# Patient Record
Sex: MALE | Race: WHITE | Employment: OTHER | URBAN - METROPOLITAN AREA
[De-identification: names, ages, dates, MRNs, and addresses within clinical notes are randomized per-mention and may not be internally consistent; named-entity substitution may affect disease eponyms.]

---

## 2022-11-07 ENCOUNTER — OFFICE VISIT (OUTPATIENT)
Dept: ORTHOPEDIC SURGERY | Age: 62
End: 2022-11-07
Payer: COMMERCIAL

## 2022-11-07 VITALS — HEIGHT: 71 IN | BODY MASS INDEX: 43.4 KG/M2 | WEIGHT: 310 LBS

## 2022-11-07 DIAGNOSIS — M25.562 CHRONIC PAIN OF LEFT KNEE: Primary | ICD-10-CM

## 2022-11-07 DIAGNOSIS — M25.462 KNEE EFFUSION, LEFT: ICD-10-CM

## 2022-11-07 DIAGNOSIS — M25.562 MEDIAL KNEE PAIN, LEFT: ICD-10-CM

## 2022-11-07 DIAGNOSIS — M25.562 KNEE MENISCUS PAIN, LEFT: ICD-10-CM

## 2022-11-07 DIAGNOSIS — M17.12 PATELLOFEMORAL ARTHRITIS OF LEFT KNEE: ICD-10-CM

## 2022-11-07 DIAGNOSIS — S83.242A ACUTE MEDIAL MENISCUS TEAR, LEFT, INITIAL ENCOUNTER: ICD-10-CM

## 2022-11-07 DIAGNOSIS — M25.562 PAIN OF LEFT KNEE AFTER INJURY: ICD-10-CM

## 2022-11-07 DIAGNOSIS — G89.29 CHRONIC PAIN OF LEFT KNEE: Primary | ICD-10-CM

## 2022-11-07 PROCEDURE — 99204 OFFICE O/P NEW MOD 45 MIN: CPT | Performed by: ORTHOPAEDIC SURGERY

## 2022-11-07 PROCEDURE — 20610 DRAIN/INJ JOINT/BURSA W/O US: CPT | Performed by: ORTHOPAEDIC SURGERY

## 2022-11-07 RX ORDER — BUPIVACAINE HYDROCHLORIDE 7.5 MG/ML
2 INJECTION, SOLUTION EPIDURAL; RETROBULBAR ONCE
Status: COMPLETED | OUTPATIENT
Start: 2022-11-07 | End: 2022-11-07

## 2022-11-07 RX ORDER — METHYLPREDNISOLONE ACETATE 80 MG/ML
80 INJECTION, SUSPENSION INTRA-ARTICULAR; INTRALESIONAL; INTRAMUSCULAR; SOFT TISSUE ONCE
Status: COMPLETED | OUTPATIENT
Start: 2022-11-07 | End: 2022-11-07

## 2022-11-07 RX ADMIN — METHYLPREDNISOLONE ACETATE 80 MG: 80 INJECTION, SUSPENSION INTRA-ARTICULAR; INTRALESIONAL; INTRAMUSCULAR; SOFT TISSUE at 10:03

## 2022-11-07 RX ADMIN — BUPIVACAINE HYDROCHLORIDE 15 MG: 7.5 INJECTION, SOLUTION EPIDURAL; RETROBULBAR at 10:03

## 2022-11-07 NOTE — PROGRESS NOTES
Cory Lagos (: 1960) is a 58 y.o. male, patient, here for evaluation of the following chief complaint(s):  Knee Pain (Left knee pain x 1 year, worse in last 3 months)       HPI:    He began having increased left knee pain almost 1 year ago. The patient states that he was injured playing golf. He describes his left knee pain now is extremely severe, sharp, and constant. His left knee pain does make it very difficult for him to go to sleep it does wake him up from sleep. The patient states his left knee pain continues to get worse. He reports that walking and lying in bed makes pain worse and rest and ice makes pain better. The patient has been taking medication for his discomfort as needed. He reports at least 1 previous injection in his left knee. The patient was seen in the emergency room in [facility]. The patient does report previous x-rays on his left knee. He reports no previous or related left knee surgery. Not on File    No current outpatient medications on file. No current facility-administered medications for this visit. History reviewed. No pertinent past medical history. History reviewed. No pertinent surgical history. History reviewed. No pertinent family history.      Social History     Socioeconomic History    Marital status:      Spouse name: Not on file    Number of children: Not on file    Years of education: Not on file    Highest education level: Not on file   Occupational History    Not on file   Tobacco Use    Smoking status: Not on file    Smokeless tobacco: Not on file   Substance and Sexual Activity    Alcohol use: Not on file    Drug use: Not on file    Sexual activity: Not on file   Other Topics Concern    Not on file   Social History Narrative    Not on file     Social Determinants of Health     Financial Resource Strain: Not on file   Food Insecurity: Not on file   Transportation Needs: Not on file   Physical Activity: Not on file Stress: Not on file   Social Connections: Not on file   Intimate Partner Violence: Not on file   Housing Stability: Not on file       Review of Systems   All other systems reviewed and are negative. Vitals:  Ht 5' 11\" (1.803 m)   Wt 310 lb (140.6 kg)   BMI 43.24 kg/m²    Body mass index is 43.24 kg/m². Ortho Exam     The patient is well-developed and well-nourished. The patient presents today in alert and oriented x3 with a normal mood and affect. The patient stands with a normal weightbearing line but walks with a slightly antalgic gait because of his left knee pain. Left knee is tender to palpation along the medial joint line and has a mild size effusion. The patient has positive Margarita´s test and the knee is stable. There is a lack full flexion secondary to the effusion but does have full extension. He does have pain with patellar compression and quadriceps contraction with crepitus. There is a positive patellar grind test.  There is 5/5 strength. The knee and lower extremity is neurovascularly intact. There is no erythema or warmth the skin is normal.    ASSESSMENT/PLAN:      1. Chronic pain of left knee  -     XR KNEE LT MIN 4 V; Future  2. Acute medial meniscus tear, left, initial encounter  -     MRI KNEE LT WO CONT; Future  3. Knee meniscus pain, left  4. Pain of left knee after injury  5. Medial knee pain, left  6. Patellofemoral arthritis of left knee  -     MRI KNEE LT WO CONT; Future  7. Knee effusion, left  -     bupivacaine (PF) (MARCAINE) 0.75 % (7.5 mg/mL) injection 15 mg; 15 mg (2 mL), Intra artICUlar, ONCE, 1 dose, On Mon 11/7/22 at 1100  -     methylPREDNISolone acetate (DEPO-MEDROL) 80 mg/mL injection 80 mg; 80 mg, Intra artICUlar, ONCE, 1 dose, On Mon 11/7/22 at 1100     XR Results (most recent):  Results from Appointment encounter on 11/07/22    XR KNEE LT MIN 4 V    Narrative  Left knee 4 view x-ray showed no evidence of a fracture or dislocation.   Evidence of advanced patellofemoral osteoarthritis. Below is the assessment and plan developed based on review of pertinent history, physical exam, labs, studies, and medications. We discussed the patient's ongoing and worsening left knee pain and his signs, symptoms, physical exam, description of his pain, description of his golf related injury, and x-rays are consistent with an acute medial meniscus tear with advanced patellofemoral compartment arthritis. He does present today with a mild size effusion present. The possible treatment options were discussed with the patient and because of an effusion present we elected to aspirate and inject his left knee with cortisone today to try and alleviate some of his discomfort. The risks and benefits of the aspiration injection were discussed in detail with the patient and under sterile prep the patient's left knee was aspirated of approximately 10 ccs of straw-colored synovial fluid and injected with 1 cc of 80 mg/cc of Depo-Medrol and 2 ccs of 0.75% Sensorcaine. He tolerated both the aspiration and injection well. Also because of the 1 year long duration of his increased pain, no improvement with multiple modalities of conservative management occluding an at-home exercise program, his physical exam, description of his pain, description of his golf injury, x-rays, and his inability to complete daily living activities and athletic activities we elected to obtain an MRI of his left knee to further evaluate the severity of his acute medial meniscus tear, patellofemoral compartment osteoarthritis, and causation for his effusion. The MRI images and results will be used in preoperative planning if and almost certainly when surgical intervention is necessary. The risks and benefits of the MRI were discussed in detail with the patient and he would like to proceed. We will schedule at his convenience.   I will see him back after his MRI is complete to discuss the images, results, further treatment options. In the interim, I did encourage him to ice and elevate when possible, modify his activity level based on his left knee pain, monitor his effusion, and use anti-inflammatory medication when necessary. The patient will also work on range of motion, strengthening, and stretching exercises with an at-home exercise program as pain tolerates. He is to avoid any deep knee bend activities against resistance, squatting, kneeling, stairs, lunging, cutting, twisting, change direction, and high impact loading activities. I will see him back as noted above after his left knee MRI is complete. **We will obtain an MRI for more information to determine the best treatment plan moving forward and help us prepare for surgical intervention if necessary. **    Return for After his left knee MRI is complete. An electronic signature was used to authenticate this note.   -- Marcella Villarreal MD

## 2022-11-14 ENCOUNTER — OFFICE VISIT (OUTPATIENT)
Dept: ORTHOPEDIC SURGERY | Age: 62
End: 2022-11-14
Payer: COMMERCIAL

## 2022-11-14 ENCOUNTER — DOCUMENTATION ONLY (OUTPATIENT)
Dept: ORTHOPEDIC SURGERY | Age: 62
End: 2022-11-14

## 2022-11-14 VITALS — BODY MASS INDEX: 42 KG/M2 | HEIGHT: 71 IN | WEIGHT: 300 LBS

## 2022-11-14 DIAGNOSIS — M94.262 CHONDROMALACIA, LEFT KNEE: ICD-10-CM

## 2022-11-14 DIAGNOSIS — M25.562 PAIN OF LEFT KNEE AFTER INJURY: ICD-10-CM

## 2022-11-14 DIAGNOSIS — M25.562 MEDIAL KNEE PAIN, LEFT: ICD-10-CM

## 2022-11-14 DIAGNOSIS — M23.42 LOOSE BODY OF LEFT KNEE: ICD-10-CM

## 2022-11-14 DIAGNOSIS — G89.29 CHRONIC PAIN OF LEFT KNEE: Primary | ICD-10-CM

## 2022-11-14 DIAGNOSIS — S83.242D ACUTE MEDIAL MENISCUS TEAR, LEFT, SUBSEQUENT ENCOUNTER: ICD-10-CM

## 2022-11-14 DIAGNOSIS — M25.562 CHRONIC PAIN OF LEFT KNEE: Primary | ICD-10-CM

## 2022-11-14 DIAGNOSIS — M25.562 KNEE MENISCUS PAIN, LEFT: ICD-10-CM

## 2022-11-14 PROCEDURE — 99214 OFFICE O/P EST MOD 30 MIN: CPT | Performed by: ORTHOPAEDIC SURGERY

## 2022-11-14 NOTE — PROGRESS NOTES
Natalia Du (: 1960) is a 58 y.o. male, patient, here for evaluation of the following chief complaint(s):  Knee Pain (Left, MRI results)       HPI:    He was last seen for his left knee pain on 2022. Since then, the patient did have an MRI performed on his left knee on 11/10/2022. The patient states that his pain is the same as it was at his last visit. He rates the severity of his left knee pain as a 7 out of 10. He describes his left knee pain as sharp and constant. His left knee pain does make it difficult for him to go to sleep it does wake him up from sleep. He has been taking Aleve for his discomfort as needed. The patient did have his left knee aspirated and injected with cortisone at his last visit. He does state that the aspiration and injection did help alleviate some of his discomfort. Left knee MRI images and results were independently reviewed and they were consistent with an undersurface horizontal tearing of the body and posterior horn of the medial meniscus with peripheral subluxation of the body. Tricompartmental chondral derangement is noted. 5 mm loose body in the posterior recess of the medial compartment. Not on File    No current outpatient medications on file. No current facility-administered medications for this visit. History reviewed. No pertinent past medical history. History reviewed. No pertinent surgical history. History reviewed. No pertinent family history.      Social History     Socioeconomic History    Marital status:      Spouse name: Not on file    Number of children: Not on file    Years of education: Not on file    Highest education level: Not on file   Occupational History    Not on file   Tobacco Use    Smoking status: Not on file    Smokeless tobacco: Not on file   Substance and Sexual Activity    Alcohol use: Not on file    Drug use: Not on file    Sexual activity: Not on file   Other Topics Concern    Not on file Social History Narrative    Not on file     Social Determinants of Health     Financial Resource Strain: Not on file   Food Insecurity: Not on file   Transportation Needs: Not on file   Physical Activity: Not on file   Stress: Not on file   Social Connections: Not on file   Intimate Partner Violence: Not on file   Housing Stability: Not on file       Review of Systems   All other systems reviewed and are negative. Vitals:  Ht 5' 11\" (1.803 m)   Wt 300 lb (136.1 kg)   BMI 41.84 kg/m²    Body mass index is 41.84 kg/m². Ortho Exam     The patient is well-developed and well-nourished. The patient presents today in alert and oriented x3 with a normal mood and affect. The patient stands with a normal weightbearing line but walks with slightly antalgic gait because of his left knee pain. He    Left knee is tender to palpation along the medial joint line and has a small effusion. The patient has positive Margarita´s test and the knee is stable. There is a lack full flexion secondary to the effusion but does have full extension. There is 5/5 strength. The knee and lower extremity is neurovascularly intact. There is no erythema or warmth the skin is normal.    ASSESSMENT/PLAN:      1. Chronic pain of left knee  2. Knee meniscus pain, left  3. Pain of left knee after injury  4. Medial knee pain, left  5. Chondromalacia, left knee  6. Acute medial meniscus tear, left, subsequent encounter  -     REFERRAL TO ORTHOPEDIC SURGERY  7. Loose body of left knee     Below is the assessment and plan developed based on review of pertinent history, physical exam, labs, studies, and medications. We discussed the patient's ongoing left knee pain and we independently reviewed his MRI images and results and they were consistent with an undersurface horizontal tearing of the body and posterior horn of the medial meniscus with peripheral subluxation of the body. Tricompartmental chondral derangement is noted.   5 mm loose body in the posterior recess of the medial compartment. The possible treatment options were discussed with the patient and because of the duration of his increased pain, minimal improvement with multiple modalities of conservative management including an at-home exercise program, his physical exam, description of his pain, past x-rays, independently reviewed MRI images and results, and his inability to complete daily living activities without significant discomfort we both decided that surgical intervention was the best treatment plan. The risks and benefits of a left knee arthroscopic exam with partial medial meniscectomy were discussed in detail with the patient and he would like to proceed. We will schedule at his convenience. In the interim, I did encourage him to ice when possible, modify his activity level based on his left knee pain, and use anti-inflammatory medication when necessary. He will also work on range of motion, strengthening, and stretching exercises with an at-home exercise program as pain tolerates. He is to avoid any deep knee bend activities against resistance, squatting, kneeling, stairs, lunging, cutting, twisting, change of direction, and high impact loading activities. I will see him back in the office on an as-needed basis or the day of his upcoming left knee surgery. Return for In the office as needed or on the day of his upcoming left knee surgery. An electronic signature was used to authenticate this note.   -- Jori France MD

## 2022-11-16 ENCOUNTER — TELEPHONE (OUTPATIENT)
Dept: ORTHOPEDIC SURGERY | Age: 62
End: 2022-11-16

## 2022-11-23 ENCOUNTER — TELEPHONE (OUTPATIENT)
Dept: ORTHOPEDIC SURGERY | Age: 62
End: 2022-11-23

## 2022-11-23 DIAGNOSIS — S83.242D ACUTE MEDIAL MENISCUS TEAR, LEFT, SUBSEQUENT ENCOUNTER: Primary | ICD-10-CM

## 2022-11-23 RX ORDER — ZOLPIDEM TARTRATE 10 MG/1
10 TABLET ORAL
Status: CANCELLED | OUTPATIENT
Start: 2022-11-23

## 2022-11-28 RX ORDER — OXYCODONE AND ACETAMINOPHEN 5; 325 MG/1; MG/1
1 TABLET ORAL
Qty: 30 TABLET | Refills: 0 | Status: SHIPPED | OUTPATIENT
Start: 2022-11-28 | End: 2022-12-05

## 2022-12-01 NOTE — PROGRESS NOTES
Keli Diego (: 1960) is a 58 y.o. male, patient, here for evaluation of the following chief complaint(s):  Knee Pain (left) and Surgical Follow-up (Sx 22)       HPI:    He is now approximately 1 week status post left knee arthroscopic exam with partial medial meniscectomy and synovectomy. His surgery was performed on 2022. The patient rates the severity of his postoperative left knee pain as a 1 out of 10. He describes his postoperative left knee pain is dull and intermittent. His pain does not wake him up from sleep at night. He has been taking aspirin for his discomfort as needed. No Known Allergies    No current outpatient medications on file. No current facility-administered medications for this visit. History reviewed. No pertinent past medical history. History reviewed. No pertinent surgical history. History reviewed. No pertinent family history. Social History     Socioeconomic History    Marital status:      Spouse name: Not on file    Number of children: Not on file    Years of education: Not on file    Highest education level: Not on file   Occupational History    Not on file   Tobacco Use    Smoking status: Not on file    Smokeless tobacco: Not on file   Substance and Sexual Activity    Alcohol use: Not on file    Drug use: Not on file    Sexual activity: Not on file   Other Topics Concern    Not on file   Social History Narrative    Not on file     Social Determinants of Health     Financial Resource Strain: Not on file   Food Insecurity: Not on file   Transportation Needs: Not on file   Physical Activity: Not on file   Stress: Not on file   Social Connections: Not on file   Intimate Partner Violence: Not on file   Housing Stability: Not on file       Review of Systems   All other systems reviewed and are negative. Vitals:  Ht 5' 11\" (1.803 m)   Wt 310 lb (140.6 kg)   BMI 43.24 kg/m²    Body mass index is 43.24 kg/m².     Ortho Exam     The patient is well-developed and well-nourished. The patient presents today in alert and oriented x3 with a normal mood and affect. The patient stands with a normal weightbearing line but walks with a slightly antalgic gait because of his postoperative left knee pain. Left knee wounds are clean and dry neurovascular intact. There is some ecchymosis but no erythema. His stitches were removed and his incisions are healing nicely with no sign of irritation or infection and look normal.  He does have full extension. There is a small postoperative effusion present which is to be expected. His seated nonweightbearing range of motion has improved since his surgical procedure. He has approximately 90 degrees of seated nonweightbearing flexion. There is limitation in his range of motion secondary to his postoperative discomfort and effusion. He can do a seated straight leg raise but there is quadriceps weakness noted compared to normal.  He reports no calf tenderness. His sensations are intact his pulses are 2+. His skin is healing nicely. ASSESSMENT/PLAN:      1. Postoperative pain of left knee  2. Status post arthroscopic surgery of left knee     Below is the assessment and plan developed based on review of pertinent history, physical exam, labs, studies, and medications. We discussed the patient's recent left knee arthroscopic exam with partial medial meniscectomy and synovectomy. His surgery was performed on 11/28/2022. The patient is now approximately 1 week status post surgical intervention. He is progressing nicely in the early stages of his recovery and having the appropriate amount of expected postoperative discomfort at this time. We did remove the patient stitches today in the office without complication. His incisions are healing nicely with no sign of irritation or infection and look normal.  He does have full extension.   There is a small postoperative effusion present which we will continue to monitor. His seated nonweightbearing range of motion and strength have improved since his surgical procedure. The patient will continue the postoperative protocol by working on range of motion, strengthening, and stretching exercises with an at-home exercise program as pain tolerates. He may continue to slowly increase activities as tolerated and as discussed today in the office. He is still to avoid any deep knee bend activities against resistance, squatting, kneeling, stairs, lunging, and high impact loading activities. I did encourage him to ice and elevate when possible, modify his activity level based on his postoperative left knee pain, monitor his postoperative effusion, and use anti-inflammatory medication when necessary. I will see him back in 3 weeks for reevaluation and further discussion of the postoperative protocol. Return in about 3 weeks (around 12/27/2022) for Re-evaluation and further discussion of the postop protocol. An electronic signature was used to authenticate this note.   -- Chantal Anguiano MD

## 2022-12-06 ENCOUNTER — OFFICE VISIT (OUTPATIENT)
Dept: ORTHOPEDIC SURGERY | Age: 62
End: 2022-12-06
Payer: COMMERCIAL

## 2022-12-06 VITALS — WEIGHT: 310 LBS | BODY MASS INDEX: 43.4 KG/M2 | HEIGHT: 71 IN

## 2022-12-06 DIAGNOSIS — G89.18 POSTOPERATIVE PAIN OF LEFT KNEE: Primary | ICD-10-CM

## 2022-12-06 DIAGNOSIS — Z98.890 STATUS POST ARTHROSCOPIC SURGERY OF LEFT KNEE: ICD-10-CM

## 2022-12-06 DIAGNOSIS — M25.562 POSTOPERATIVE PAIN OF LEFT KNEE: Primary | ICD-10-CM

## 2022-12-06 PROCEDURE — 99024 POSTOP FOLLOW-UP VISIT: CPT | Performed by: ORTHOPAEDIC SURGERY

## 2022-12-20 ENCOUNTER — OFFICE VISIT (OUTPATIENT)
Dept: ORTHOPEDIC SURGERY | Age: 62
End: 2022-12-20
Payer: COMMERCIAL

## 2022-12-20 VITALS — HEIGHT: 71 IN | WEIGHT: 300 LBS | BODY MASS INDEX: 42 KG/M2

## 2022-12-20 DIAGNOSIS — G89.18 POSTOPERATIVE PAIN OF LEFT KNEE: Primary | ICD-10-CM

## 2022-12-20 DIAGNOSIS — Z98.890 STATUS POST ARTHROSCOPIC SURGERY OF LEFT KNEE: ICD-10-CM

## 2022-12-20 DIAGNOSIS — M25.562 POSTOPERATIVE PAIN OF LEFT KNEE: Primary | ICD-10-CM

## 2022-12-20 PROCEDURE — 99024 POSTOP FOLLOW-UP VISIT: CPT | Performed by: ORTHOPAEDIC SURGERY

## 2022-12-20 NOTE — PROGRESS NOTES
Derrell Haro (: 1960) is a 58 y.o. male, patient, here for evaluation of the following chief complaint(s):  Knee Pain (Left ) and Surgical Follow-up (Sx 22)       HPI:    He is now approximately 3 weeks status post left knee arthroscopic exam with partial medial meniscectomy and synovectomy. His surgery was performed on 2022. He was last seen on 2022. The patient states that his pain level has improved since his last visit and surgical procedure. He rates the severity of his postoperative left knee pain as a 1 out of 10. He describes his pain is dull and intermittent. His postoperative left knee pain does not wake him up from sleep at night. He reports taking no medication for his postoperative discomfort. No Known Allergies    No current outpatient medications on file. No current facility-administered medications for this visit. History reviewed. No pertinent past medical history. History reviewed. No pertinent surgical history. History reviewed. No pertinent family history.      Social History     Socioeconomic History    Marital status:      Spouse name: Not on file    Number of children: Not on file    Years of education: Not on file    Highest education level: Not on file   Occupational History    Not on file   Tobacco Use    Smoking status: Not on file    Smokeless tobacco: Not on file   Substance and Sexual Activity    Alcohol use: Not on file    Drug use: Not on file    Sexual activity: Not on file   Other Topics Concern    Not on file   Social History Narrative    Not on file     Social Determinants of Health     Financial Resource Strain: Not on file   Food Insecurity: Not on file   Transportation Needs: Not on file   Physical Activity: Not on file   Stress: Not on file   Social Connections: Not on file   Intimate Partner Violence: Not on file   Housing Stability: Not on file       Review of Systems   All other systems reviewed and are negative. Vitals:  Ht 5' 11\" (1.803 m)   Wt 300 lb (136.1 kg)   BMI 41.84 kg/m²    Body mass index is 41.84 kg/m². Ortho Exam     The patient is well-developed and well-nourished. The patient presents today in alert and oriented x3 with a normal mood and affect. The patient stands with a normal weightbearing line but but walks with a slightly antalgic gait because of his postoperative left knee pain. Left knee wounds are clean and dry neurovascular intact. There is no ecchymosis or erythema. His incisions are well-healed with no sign of irritation or infection and look normal.  He does have full extension. There is no significant postoperative effusion present. His range of motion continues to improve nicely. He has approximately 120 degrees of seated nonweightbearing flexion. There is limitation in his range of motion secondary to his postoperative discomfort which is normal to be expected. His quadricep strength continues to improve. He can easily do a seated straight leg raise without discomfort but there is quadriceps weakness noted compared to normal.  He reports no calf tenderness. His sensations are intact his pulses are 2+. His skin is well-healed. ASSESSMENT/PLAN:      1. Postoperative pain of left knee  2. Status post arthroscopic surgery of left knee     Below is the assessment and plan developed based on review of pertinent history, physical exam, labs, studies, and medications. We discussed the patient's left knee arthroscopic exam with partial medial meniscectomy and synovectomy. His surgery was performed on 11/28/2022. He is now approximately 3 weeks status postsurgical intervention. The patient continues to progress nicely in his recovery. His pain is intermittent and well controlled. His incisions are well-healed with no sign of irritation or infection and look normal.  He does have full extension. There is no postoperative effusion present.   His range of motion and strength continue to improve. The patient will continue the postoperative protocol by working on range of motion, strengthening, and stretching exercises with an at-home exercise program as pain tolerates. He may continue to increase activities as tolerated and as discussed today in the office. He is still to avoid any deep knee bend activities against resistance, squatting, kneeling, stairs, lunging, and high impact loading activities. I did encourage him to continue to ice and elevate when possible, modify his activity level based on his postoperative left knee pain, and use anti-inflammatory medication when necessary. I will see him back in 3 weeks for reevaluation and further discussion of the postoperative protocol. Return in about 4 weeks (around 1/17/2023), or if symptoms worsen or fail to improve. An electronic signature was used to authenticate this note.   -- Alexandria Levine MD

## 2024-10-15 ENCOUNTER — HOSPITAL ENCOUNTER (EMERGENCY)
Facility: HOSPITAL | Age: 64
Discharge: HOME | End: 2024-10-15
Attending: EMERGENCY MEDICINE
Payer: COMMERCIAL

## 2024-10-15 VITALS
HEART RATE: 76 BPM | HEIGHT: 71 IN | OXYGEN SATURATION: 98 % | TEMPERATURE: 98.5 F | RESPIRATION RATE: 16 BRPM | BODY MASS INDEX: 38.92 KG/M2 | DIASTOLIC BLOOD PRESSURE: 82 MMHG | WEIGHT: 278 LBS | SYSTOLIC BLOOD PRESSURE: 127 MMHG

## 2024-10-15 DIAGNOSIS — I83.891 BLEEDING FROM VARICOSE VEINS OF LOWER EXTREMITY, RIGHT: Primary | ICD-10-CM

## 2024-10-15 PROCEDURE — 99283 EMERGENCY DEPT VISIT LOW MDM: CPT

## 2024-10-15 RX ORDER — AMLODIPINE AND BENAZEPRIL HYDROCHLORIDE 5; 20 MG/1; MG/1
1 CAPSULE ORAL DAILY
COMMUNITY

## 2024-10-15 RX ORDER — TAMSULOSIN HYDROCHLORIDE 0.4 MG/1
1 CAPSULE ORAL DAILY
COMMUNITY

## 2024-10-15 ASSESSMENT — PAIN SCALES - GENERAL: PAINLEVEL_OUTOF10: 0

## 2024-10-16 NOTE — ED TRIAGE NOTES
Pt arrived via EMS from home with report of bleeding to RLL - anterior aspect where he has a varicose vein. Bleeding is a steady flow per pt that started after getting out of hot shower at 2130 tonight. Pt states that he may have scratched his leg d/t insect biting him. Pressure dressing intact and dry. + pedal pulses - strong.

## 2024-10-16 NOTE — ED PROVIDER NOTES
[facility] EMERGENCY DEP  EMERGENCY DEPARTMENT ENCOUNTER       Pt Name: Efrain Santiago  MRN: [id number]  Birthdate 1960  Date of evaluation: 10/15/2024  Provider: Vinicio Peña MD   PCP: Merlin Lorenzo MD  Note Started: 11:31 PM 10/15/24      FINAL IMPRESSION     1. Bleeding from varicose veins of lower extremity, right          DISPOSITION/PLAN     Disposition:  DISPOSITION Decision To Discharge 10/15/2024 11:30:23 PM  Condition at Disposition: Data Unavailable      Discharge Note: The patient is stable for discharge home. The signs, symptoms, diagnosis, and discharge instructions have been discussed, understanding conveyed, and agreed upon. The patient is to follow up as recommended or return to ER should their symptoms worsen.      PATIENT REFERRED TO:  Merlin Lorenzo MD  [address] [address] [phone]    Schedule an appointment as soon as possible for a visit in 2 days  For Follow Up, If Not Improving          DISCHARGE MEDICATIONS:     Medication List        ASK your doctor about these medications      amLODIPine-benazepril 5-20 MG per capsule  Commonly known as: LOTREL     ASPIRIN 81 PO     tamsulosin 0.4 MG capsule  Commonly known as: FLOMAX                DISCONTINUED MEDICATIONS:  Current Discharge Medication List          Return to ED if worse      CHIEF COMPLAINT       Chief Complaint   Patient presents with   • Varicose Veins     Bleeding varicose vein left lower anterior leg        HISTORY OF PRESENT ILLNESS: 1 or more elements      History From: Patient  None     HPI    Efrain Santiago is a 64 y.o. male who presents complaining of bleeding from a varicose vein of the right lower leg.  Patient reports she had some bug bites and has been scratching his legs scratch his leg and it started bleeding.  EMS was called applied a pressure dressing and is brought in.  Patient takes aspirin no other blood thinners.      There are no other complaints, changes, or physical

## 2025-08-12 ENCOUNTER — APPOINTMENT (OUTPATIENT)
Facility: HOSPITAL | Age: 65
End: 2025-08-12
Payer: MEDICARE

## 2025-08-12 ENCOUNTER — HOSPITAL ENCOUNTER (EMERGENCY)
Facility: HOSPITAL | Age: 65
Discharge: HOME | End: 2025-08-12
Attending: EMERGENCY MEDICINE
Payer: MEDICARE

## 2025-08-12 ENCOUNTER — TRANSCRIBE ORDERS (OUTPATIENT)
Facility: HOSPITAL | Age: 65
End: 2025-08-12

## 2025-08-12 VITALS
OXYGEN SATURATION: 94 % | BODY MASS INDEX: 37.65 KG/M2 | WEIGHT: 263 LBS | SYSTOLIC BLOOD PRESSURE: 119 MMHG | DIASTOLIC BLOOD PRESSURE: 74 MMHG | TEMPERATURE: 98.9 F | RESPIRATION RATE: 20 BRPM | HEIGHT: 70 IN | HEART RATE: 95 BPM

## 2025-08-12 DIAGNOSIS — R07.81 PLEURITIC CHEST PAIN: Primary | ICD-10-CM

## 2025-08-12 DIAGNOSIS — I31.9 PERICARDITIS, UNSPECIFIED CHRONICITY, UNSPECIFIED TYPE: Primary | ICD-10-CM

## 2025-08-12 LAB
ALBUMIN SERPL-MCNC: 3.7 G/DL (ref 3.5–5)
ALBUMIN/GLOB SERPL: 0.9 (ref 1.1–2.2)
ALP SERPL-CCNC: 72 U/L (ref 45–117)
ALT SERPL-CCNC: 22 U/L (ref 12–78)
ANION GAP SERPL CALC-SCNC: 7 MMOL/L (ref 2–12)
AST SERPL-CCNC: 15 U/L (ref 15–37)
BASOPHILS # BLD: 0.05 K/UL (ref 0–0.1)
BASOPHILS NFR BLD: 0.4 % (ref 0–1)
BILIRUB SERPL-MCNC: 0.7 MG/DL (ref 0.2–1)
BUN SERPL-MCNC: 16 MG/DL (ref 6–20)
BUN/CREAT SERPL: 15 (ref 12–20)
CALCIUM SERPL-MCNC: 9.2 MG/DL (ref 8.5–10.1)
CHLORIDE SERPL-SCNC: 100 MMOL/L (ref 97–108)
CO2 SERPL-SCNC: 31 MMOL/L (ref 21–32)
CREAT SERPL-MCNC: 1.08 MG/DL (ref 0.7–1.3)
CRP SERPL HS-MCNC: 2.4 MG/L (ref 0–5)
D DIMER PPP FEU-MCNC: 0.57 MG/L FEU (ref 0–0.65)
DIFFERENTIAL METHOD BLD: ABNORMAL
EKG ATRIAL RATE: 101 BPM
EKG ATRIAL RATE: 93 BPM
EKG DIAGNOSIS: NORMAL
EKG DIAGNOSIS: NORMAL
EKG P AXIS: 30 DEGREES
EKG P AXIS: 42 DEGREES
EKG P-R INTERVAL: 166 MS
EKG P-R INTERVAL: 170 MS
EKG Q-T INTERVAL: 344 MS
EKG Q-T INTERVAL: 344 MS
EKG QRS DURATION: 86 MS
EKG QRS DURATION: 86 MS
EKG QTC CALCULATION (BAZETT): 427 MS
EKG QTC CALCULATION (BAZETT): 446 MS
EKG R AXIS: -11 DEGREES
EKG R AXIS: -12 DEGREES
EKG T AXIS: 57 DEGREES
EKG T AXIS: 58 DEGREES
EKG VENTRICULAR RATE: 101 BPM
EKG VENTRICULAR RATE: 93 BPM
EOSINOPHIL # BLD: 0.07 K/UL (ref 0–0.4)
EOSINOPHIL NFR BLD: 0.6 % (ref 0–7)
ERYTHROCYTE [DISTWIDTH] IN BLOOD BY AUTOMATED COUNT: 13 % (ref 11.5–14.5)
ERYTHROCYTE [SEDIMENTATION RATE] IN BLOOD: 15 MM/HR (ref 0–20)
FLUAV RNA SPEC QL NAA+PROBE: NOT DETECTED
FLUBV RNA SPEC QL NAA+PROBE: NOT DETECTED
GLOBULIN SER CALC-MCNC: 4.2 G/DL (ref 2–4)
GLUCOSE SERPL-MCNC: 91 MG/DL (ref 65–100)
HCT VFR BLD AUTO: 41.9 % (ref 36.6–50.3)
HGB BLD-MCNC: 14.4 G/DL (ref 12.1–17)
IMM GRANULOCYTES # BLD AUTO: 0.03 K/UL (ref 0–0.04)
IMM GRANULOCYTES NFR BLD AUTO: 0.2 % (ref 0–0.5)
LACTATE SERPL-SCNC: 1.6 MMOL/L (ref 0.4–2)
LIPASE SERPL-CCNC: 31 U/L (ref 13–75)
LYMPHOCYTES # BLD: 0.99 K/UL (ref 0.8–3.5)
LYMPHOCYTES NFR BLD: 8.2 % (ref 12–49)
MAGNESIUM SERPL-MCNC: 1.7 MG/DL (ref 1.6–2.4)
MCH RBC QN AUTO: 31.7 PG (ref 26–34)
MCHC RBC AUTO-ENTMCNC: 34.4 G/DL (ref 30–36.5)
MCV RBC AUTO: 92.3 FL (ref 80–99)
MONOCYTES # BLD: 0.84 K/UL (ref 0–1)
MONOCYTES NFR BLD: 7 % (ref 5–13)
NEUTS SEG # BLD: 10.03 K/UL (ref 1.8–8)
NEUTS SEG NFR BLD: 83.6 % (ref 32–75)
NRBC # BLD: 0 K/UL (ref 0–0.01)
NRBC BLD-RTO: 0 PER 100 WBC
PLATELET # BLD AUTO: 228 K/UL (ref 150–400)
PMV BLD AUTO: 9.5 FL (ref 8.9–12.9)
POTASSIUM SERPL-SCNC: 4 MMOL/L (ref 3.5–5.1)
PROT SERPL-MCNC: 7.9 G/DL (ref 6.4–8.2)
RBC # BLD AUTO: 4.54 M/UL (ref 4.1–5.7)
SARS-COV-2 RNA RESP QL NAA+PROBE: NOT DETECTED
SODIUM SERPL-SCNC: 138 MMOL/L (ref 136–145)
SOURCE: NORMAL
TROPONIN I SERPL HS-MCNC: 7 NG/L (ref 0–76)
TROPONIN I SERPL HS-MCNC: 8 NG/L (ref 0–76)
WBC # BLD AUTO: 12 K/UL (ref 4.1–11.1)

## 2025-08-12 PROCEDURE — 86141 C-REACTIVE PROTEIN HS: CPT

## 2025-08-12 PROCEDURE — 85379 FIBRIN DEGRADATION QUANT: CPT

## 2025-08-12 PROCEDURE — 80053 COMPREHEN METABOLIC PANEL: CPT

## 2025-08-12 PROCEDURE — 93005 ELECTROCARDIOGRAM TRACING: CPT | Performed by: EMERGENCY MEDICINE

## 2025-08-12 PROCEDURE — 36415 COLL VENOUS BLD VENIPUNCTURE: CPT

## 2025-08-12 PROCEDURE — 71045 X-RAY EXAM CHEST 1 VIEW: CPT

## 2025-08-12 PROCEDURE — 85652 RBC SED RATE AUTOMATED: CPT

## 2025-08-12 PROCEDURE — 87636 SARSCOV2 & INF A&B AMP PRB: CPT

## 2025-08-12 PROCEDURE — 96374 THER/PROPH/DIAG INJ IV PUSH: CPT

## 2025-08-12 PROCEDURE — 83605 ASSAY OF LACTIC ACID: CPT

## 2025-08-12 PROCEDURE — 87040 BLOOD CULTURE FOR BACTERIA: CPT

## 2025-08-12 PROCEDURE — 99285 EMERGENCY DEPT VISIT HI MDM: CPT

## 2025-08-12 PROCEDURE — 83690 ASSAY OF LIPASE: CPT

## 2025-08-12 PROCEDURE — 71275 CT ANGIOGRAPHY CHEST: CPT

## 2025-08-12 PROCEDURE — 6370000000 HC RX 637 (ALT 250 FOR IP): Performed by: EMERGENCY MEDICINE

## 2025-08-12 PROCEDURE — 85025 COMPLETE CBC W/AUTO DIFF WBC: CPT

## 2025-08-12 PROCEDURE — 6360000002 HC RX W HCPCS: Performed by: EMERGENCY MEDICINE

## 2025-08-12 PROCEDURE — 83735 ASSAY OF MAGNESIUM: CPT

## 2025-08-12 PROCEDURE — 6360000004 HC RX CONTRAST MEDICATION: Performed by: EMERGENCY MEDICINE

## 2025-08-12 PROCEDURE — 84484 ASSAY OF TROPONIN QUANT: CPT

## 2025-08-12 RX ORDER — INDOMETHACIN 50 MG/1
50 CAPSULE ORAL
Qty: 30 CAPSULE | Refills: 0 | Status: SHIPPED | OUTPATIENT
Start: 2025-08-12

## 2025-08-12 RX ORDER — KETOROLAC TROMETHAMINE 15 MG/ML
15 INJECTION, SOLUTION INTRAMUSCULAR; INTRAVENOUS
Status: COMPLETED | OUTPATIENT
Start: 2025-08-12 | End: 2025-08-12

## 2025-08-12 RX ORDER — MULTIVITAMIN WITH IRON
1000 TABLET ORAL DAILY
COMMUNITY

## 2025-08-12 RX ORDER — ASPIRIN 81 MG/1
324 TABLET, CHEWABLE ORAL ONCE
Status: COMPLETED | OUTPATIENT
Start: 2025-08-12 | End: 2025-08-12

## 2025-08-12 RX ORDER — IOPAMIDOL 755 MG/ML
100 INJECTION, SOLUTION INTRAVASCULAR
Status: COMPLETED | OUTPATIENT
Start: 2025-08-12 | End: 2025-08-12

## 2025-08-12 RX ADMIN — IOPAMIDOL 100 ML: 755 INJECTION, SOLUTION INTRAVENOUS at 02:19

## 2025-08-12 RX ADMIN — ASPIRIN 324 MG: 81 TABLET, CHEWABLE ORAL at 00:45

## 2025-08-12 RX ADMIN — KETOROLAC TROMETHAMINE 15 MG: 15 INJECTION, SOLUTION INTRAMUSCULAR; INTRAVENOUS at 03:05

## 2025-08-12 ASSESSMENT — ENCOUNTER SYMPTOMS
ABDOMINAL PAIN: 0
NAUSEA: 0
SHORTNESS OF BREATH: 1
VOMITING: 0

## 2025-08-12 ASSESSMENT — PAIN SCALES - GENERAL
PAINLEVEL_OUTOF10: 7
PAINLEVEL_OUTOF10: 1
PAINLEVEL_OUTOF10: 7
PAINLEVEL_OUTOF10: 2

## 2025-08-12 ASSESSMENT — PAIN DESCRIPTION - LOCATION
LOCATION: CHEST

## 2025-08-12 ASSESSMENT — HEART SCORE: ECG: NON-SPECIFC REPOLARIZATION DISTURBANCE/LBTB/PM

## 2025-08-12 ASSESSMENT — PAIN DESCRIPTION - ORIENTATION
ORIENTATION: MID
ORIENTATION: MID

## 2025-08-12 ASSESSMENT — LIFESTYLE VARIABLES
HOW OFTEN DO YOU HAVE A DRINK CONTAINING ALCOHOL: MONTHLY OR LESS
HOW MANY STANDARD DRINKS CONTAINING ALCOHOL DO YOU HAVE ON A TYPICAL DAY: 1 OR 2

## 2025-08-12 ASSESSMENT — PAIN DESCRIPTION - FREQUENCY
FREQUENCY: CONTINUOUS
FREQUENCY: CONTINUOUS

## 2025-08-12 ASSESSMENT — PAIN - FUNCTIONAL ASSESSMENT
PAIN_FUNCTIONAL_ASSESSMENT: 0-10
PAIN_FUNCTIONAL_ASSESSMENT: ACTIVITIES ARE NOT PREVENTED
PAIN_FUNCTIONAL_ASSESSMENT: 0-10
PAIN_FUNCTIONAL_ASSESSMENT: 0-10
PAIN_FUNCTIONAL_ASSESSMENT: ACTIVITIES ARE NOT PREVENTED

## 2025-08-12 ASSESSMENT — PAIN DESCRIPTION - DESCRIPTORS
DESCRIPTORS: ACHING;DULL
DESCRIPTORS: ACHING;DULL

## 2025-08-12 ASSESSMENT — PAIN DESCRIPTION - ONSET
ONSET: ON-GOING
ONSET: ON-GOING

## 2025-08-12 ASSESSMENT — PAIN DESCRIPTION - PAIN TYPE
TYPE: ACUTE PAIN
TYPE: ACUTE PAIN

## 2025-08-17 LAB
BACTERIA SPEC CULT: NORMAL
BACTERIA SPEC CULT: NORMAL
SERVICE CMNT-IMP: NORMAL
SERVICE CMNT-IMP: NORMAL
